# Patient Record
(demographics unavailable — no encounter records)

---

## 2025-05-06 NOTE — HISTORY OF PRESENT ILLNESS
[FreeTextEntry1] : 31 year old  here for annual visit, saw her gyn last year. Currently on her period. Hx of DM1 and hypothyroid (s/p total; thyroidectomy) states that recently has been having yeast infections, currently DM uncontrolled, getting insulin management with endo. Has no other gyn complaints, declining internal exam due to period. Pobhx: C/Sx1 8lbs , MABx1 Pgynhx: denies abnormal paps, hx of ovarian cyst (removed when 13, pt unsure of path), s/p hpv vaccine PMH: hypothyroid, DM1 PSH: C/S, ovarian cystectomy, total thyoidrectomy FH: denies gyn cancers, vte, breast cancer

## 2025-05-06 NOTE — PLAN
[FreeTextEntry1] : 31 yaer old here for annual, unable to be performed heavy bleeding (c/w period), and recurrent yeast infections -return for pap -fluconazole given  -baseline mammo 35 -cont to see endo for DM/thyroid management Shy Swanson MD

## 2025-06-24 NOTE — PLAN
[FreeTextEntry1] : 31 year old female presents for PAP  -PAP done had declined at prior visit   RTO for annual Shy Swanson MD

## 2025-06-24 NOTE — END OF VISIT
[FreeTextEntry3] : I, Ursula Yvon, acted as a scribe on behalf of Dr. Shy Swanson M.D. on 06/24/2025.   All medical entries made by the scribe were at my, Dr. Shy Swanson M.D. direction and personally dictated by me on 06/24/2025. I have reviewed the chart and agree that the record accurately reflects my personal performance of the history, physical exam, assessment and plan. I have also personally directed, reviewed, and agreed with the chart.